# Patient Record
Sex: FEMALE | Race: WHITE
[De-identification: names, ages, dates, MRNs, and addresses within clinical notes are randomized per-mention and may not be internally consistent; named-entity substitution may affect disease eponyms.]

---

## 2019-07-29 ENCOUNTER — HOSPITAL ENCOUNTER (INPATIENT)
Dept: HOSPITAL 92 - ERS | Age: 54
LOS: 5 days | Discharge: HOME | DRG: 381 | End: 2019-08-03
Attending: INTERNAL MEDICINE | Admitting: INTERNAL MEDICINE
Payer: COMMERCIAL

## 2019-07-29 VITALS — BODY MASS INDEX: 29.4 KG/M2

## 2019-07-29 DIAGNOSIS — F41.9: ICD-10-CM

## 2019-07-29 DIAGNOSIS — K22.10: Primary | ICD-10-CM

## 2019-07-29 DIAGNOSIS — Z88.8: ICD-10-CM

## 2019-07-29 DIAGNOSIS — E87.6: ICD-10-CM

## 2019-07-29 DIAGNOSIS — M19.90: ICD-10-CM

## 2019-07-29 DIAGNOSIS — Z90.710: ICD-10-CM

## 2019-07-29 DIAGNOSIS — K57.92: ICD-10-CM

## 2019-07-29 DIAGNOSIS — F32.9: ICD-10-CM

## 2019-07-29 DIAGNOSIS — F10.10: ICD-10-CM

## 2019-07-29 DIAGNOSIS — I10: ICD-10-CM

## 2019-07-29 DIAGNOSIS — Z90.49: ICD-10-CM

## 2019-07-29 DIAGNOSIS — Z79.899: ICD-10-CM

## 2019-07-29 LAB
ALBUMIN SERPL BCG-MCNC: 4.7 G/DL (ref 3.5–5)
ALP SERPL-CCNC: 169 U/L (ref 40–150)
ALT SERPL W P-5'-P-CCNC: 11 U/L (ref 8–55)
ANION GAP SERPL CALC-SCNC: 20 MMOL/L (ref 10–20)
AST SERPL-CCNC: 23 U/L (ref 5–34)
BASOPHILS # BLD AUTO: 0 THOU/UL (ref 0–0.2)
BASOPHILS NFR BLD AUTO: 0.3 % (ref 0–1)
BILIRUB SERPL-MCNC: 0.6 MG/DL (ref 0.2–1.2)
BUN SERPL-MCNC: 17 MG/DL (ref 9.8–20.1)
CALCIUM SERPL-MCNC: 10.6 MG/DL (ref 7.8–10.44)
CHLORIDE SERPL-SCNC: 104 MMOL/L (ref 98–107)
CO2 SERPL-SCNC: 23 MMOL/L (ref 22–29)
CREAT CL PREDICTED SERPL C-G-VRATE: 0 ML/MIN (ref 70–130)
EOSINOPHIL # BLD AUTO: 0.1 THOU/UL (ref 0–0.7)
EOSINOPHIL NFR BLD AUTO: 0.4 % (ref 0–10)
GLOBULIN SER CALC-MCNC: 3.3 G/DL (ref 2.4–3.5)
GLUCOSE SERPL-MCNC: 123 MG/DL (ref 70–105)
HGB BLD-MCNC: 15 G/DL (ref 12–16)
LIPASE SERPL-CCNC: 25 U/L (ref 8–78)
LYMPHOCYTES # BLD: 2.2 THOU/UL (ref 1.2–3.4)
LYMPHOCYTES NFR BLD AUTO: 14.7 % (ref 21–51)
MCH RBC QN AUTO: 31.6 PG (ref 27–31)
MCV RBC AUTO: 90.6 FL (ref 78–98)
MONOCYTES # BLD AUTO: 1 THOU/UL (ref 0.11–0.59)
MONOCYTES NFR BLD AUTO: 6.5 % (ref 0–10)
NEUTROPHILS # BLD AUTO: 11.7 THOU/UL (ref 1.4–6.5)
NEUTROPHILS NFR BLD AUTO: 78.1 % (ref 42–75)
PLATELET # BLD AUTO: 340 THOU/UL (ref 130–400)
POTASSIUM SERPL-SCNC: 3.8 MMOL/L (ref 3.5–5.1)
PREGS CONTROL BACKGROUND?: (no result)
PREGS CONTROL BAR APPEAR?: YES
PROT UR STRIP.AUTO-MCNC: 20 MG/DL
RBC # BLD AUTO: 4.75 MILL/UL (ref 4.2–5.4)
SODIUM SERPL-SCNC: 143 MMOL/L (ref 136–145)
WBC # BLD AUTO: 15 THOU/UL (ref 4.8–10.8)

## 2019-07-29 PROCEDURE — 81003 URINALYSIS AUTO W/O SCOPE: CPT

## 2019-07-29 PROCEDURE — 96374 THER/PROPH/DIAG INJ IV PUSH: CPT

## 2019-07-29 PROCEDURE — 74177 CT ABD & PELVIS W/CONTRAST: CPT

## 2019-07-29 PROCEDURE — 90732 PPSV23 VACC 2 YRS+ SUBQ/IM: CPT

## 2019-07-29 PROCEDURE — 80053 COMPREHEN METABOLIC PANEL: CPT

## 2019-07-29 PROCEDURE — 96375 TX/PRO/DX INJ NEW DRUG ADDON: CPT

## 2019-07-29 PROCEDURE — 90471 IMMUNIZATION ADMIN: CPT

## 2019-07-29 PROCEDURE — 84443 ASSAY THYROID STIM HORMONE: CPT

## 2019-07-29 PROCEDURE — 36415 COLL VENOUS BLD VENIPUNCTURE: CPT

## 2019-07-29 PROCEDURE — 83690 ASSAY OF LIPASE: CPT

## 2019-07-29 PROCEDURE — 80307 DRUG TEST PRSMV CHEM ANLYZR: CPT

## 2019-07-29 PROCEDURE — 93005 ELECTROCARDIOGRAM TRACING: CPT

## 2019-07-29 PROCEDURE — 96361 HYDRATE IV INFUSION ADD-ON: CPT

## 2019-07-29 PROCEDURE — G0009 ADMIN PNEUMOCOCCAL VACCINE: HCPCS

## 2019-07-29 PROCEDURE — 80306 DRUG TEST PRSMV INSTRMNT: CPT

## 2019-07-29 PROCEDURE — 80048 BASIC METABOLIC PNL TOTAL CA: CPT

## 2019-07-29 PROCEDURE — 84703 CHORIONIC GONADOTROPIN ASSAY: CPT

## 2019-07-29 PROCEDURE — 84702 CHORIONIC GONADOTROPIN TEST: CPT

## 2019-07-29 PROCEDURE — C9113 INJ PANTOPRAZOLE SODIUM, VIA: HCPCS

## 2019-07-29 PROCEDURE — 83735 ASSAY OF MAGNESIUM: CPT

## 2019-07-29 PROCEDURE — 82274 ASSAY TEST FOR BLOOD FECAL: CPT

## 2019-07-29 PROCEDURE — 85025 COMPLETE CBC W/AUTO DIFF WBC: CPT

## 2019-07-29 RX ADMIN — ONDANSETRON PRN MG: 2 INJECTION INTRAMUSCULAR; INTRAVENOUS at 18:09

## 2019-07-29 NOTE — CT
CT abdomen and pelvis with IV contrast



HISTORY: Left lower quadrant pain.



FINDINGS: The lung bases are clear. Gallbladder is surgically absent. The liver, spleen, kidneys, adr
enal glands, and pancreas have a normal CT appearance. Nonspecific lymph nodes throughout the

retroperitoneum. Colon is decompressed with borderline diffuse fatty infiltration. Diverticula arise 
from the colon without adjacent inflammation. No free air or free fluid. Degenerative changes

lumbar spine.







IMPRESSION: Diverticulosis. No evidence of diverticulitis.



Status post cholecystectomy



Reported By: ABBY Cortes 

Electronically Signed:  7/29/2019 10:36 AM

## 2019-07-30 LAB
ALBUMIN SERPL BCG-MCNC: 3.6 G/DL (ref 3.5–5)
ALP SERPL-CCNC: 120 U/L (ref 40–150)
ALT SERPL W P-5'-P-CCNC: 8 U/L (ref 8–55)
ANION GAP SERPL CALC-SCNC: 12 MMOL/L (ref 10–20)
AST SERPL-CCNC: 21 U/L (ref 5–34)
BASOPHILS # BLD AUTO: 0.1 THOU/UL (ref 0–0.2)
BASOPHILS NFR BLD AUTO: 0.7 % (ref 0–1)
BILIRUB SERPL-MCNC: 0.6 MG/DL (ref 0.2–1.2)
BUN SERPL-MCNC: 7 MG/DL (ref 9.8–20.1)
CALCIUM SERPL-MCNC: 8.3 MG/DL (ref 7.8–10.44)
CHLORIDE SERPL-SCNC: 108 MMOL/L (ref 98–107)
CO2 SERPL-SCNC: 22 MMOL/L (ref 22–29)
CREAT CL PREDICTED SERPL C-G-VRATE: 115 ML/MIN (ref 70–130)
DRUG SCREEN CUTOFF: (no result)
EOSINOPHIL # BLD AUTO: 0.2 THOU/UL (ref 0–0.7)
EOSINOPHIL NFR BLD AUTO: 2.3 % (ref 0–10)
GLOBULIN SER CALC-MCNC: 2.5 G/DL (ref 2.4–3.5)
GLUCOSE SERPL-MCNC: 92 MG/DL (ref 70–105)
HGB BLD-MCNC: 12.8 G/DL (ref 12–16)
LYMPHOCYTES # BLD: 2.1 THOU/UL (ref 1.2–3.4)
LYMPHOCYTES NFR BLD AUTO: 23.3 % (ref 21–51)
MCH RBC QN AUTO: 32.5 PG (ref 27–31)
MCV RBC AUTO: 96.6 FL (ref 78–98)
MEDTOX CONTROL LINE VALID?: (no result)
MEDTOX READER #: (no result)
MONOCYTES # BLD AUTO: 0.7 THOU/UL (ref 0.11–0.59)
MONOCYTES NFR BLD AUTO: 8.1 % (ref 0–10)
NEUTROPHILS # BLD AUTO: 5.8 THOU/UL (ref 1.4–6.5)
NEUTROPHILS NFR BLD AUTO: 65.7 % (ref 42–75)
PLATELET # BLD AUTO: 235 THOU/UL (ref 130–400)
POTASSIUM SERPL-SCNC: 3.1 MMOL/L (ref 3.5–5.1)
RBC # BLD AUTO: 3.95 MILL/UL (ref 4.2–5.4)
SODIUM SERPL-SCNC: 139 MMOL/L (ref 136–145)
WBC # BLD AUTO: 8.8 THOU/UL (ref 4.8–10.8)

## 2019-07-30 RX ADMIN — ONDANSETRON PRN MG: 2 INJECTION INTRAMUSCULAR; INTRAVENOUS at 08:27

## 2019-07-30 RX ADMIN — ONDANSETRON PRN MG: 2 INJECTION INTRAMUSCULAR; INTRAVENOUS at 21:41

## 2019-07-30 RX ADMIN — ASCORBIC ACID, VITAMIN A PALMITATE, CHOLECALCIFEROL, THIAMINE HYDROCHLORIDE, RIBOFLAVIN-5 PHOSPHATE SODIUM, PYRIDOXINE HYDROCHLORIDE, NIACINAMIDE, DEXPANTHENOL, ALPHA-TOCOPHEROL ACETATE, VITAMIN K1, FOLIC ACID, BIOTIN, CYANOCOBALAMIN SCH MLS: 200; 3300; 200; 6; 3.6; 6; 40; 15; 10; 150; 600; 60; 5 INJECTION, SOLUTION INTRAVENOUS at 11:05

## 2019-07-30 RX ADMIN — ONDANSETRON PRN MG: 2 INJECTION INTRAMUSCULAR; INTRAVENOUS at 14:10

## 2019-07-30 NOTE — HP
PRIMARY CARE PHYSICIAN:  Patient no longer has PCP.



CODE STATUS:  The patient is a full code.



HISTORY OF PRESENT ILLNESS:  The patient is an unfortunate 54-year-old female 
with

past medical history of alcohol abuse, anxiety, and hypertension that presents 
for

evaluation of nausea, vomiting, and diarrhea x3 days.  The patient reports 
multiple

stressors in her life.  She recently lost her job with [id number] and is at odds 
with her

daughter.  She has a history of drinking approximately 6 beers daily or 1/3 
handle

of rum for the past 15 years.  She reports going on a 3 day wang in what she 
drank

4 handle of rum daily for 3 days after that she decided to quit "cold turkey" 
and over

the past several days, developed nausea, vomiting, and diarrhea and this is what

brought her into the ER.  In the ER, the patient was found to be tachycardic.  
The

patient was given IV fluids, Ativan and some folic acid which did not improve 
heart rate.

 The patient was then admitted to tele floor for further management. 



PAST MEDICAL HISTORY:  

1. Hypertension.

2. Osteoarthritis.

3. Anxiety.



PAST SURGICAL HISTORY:  

1. The patient has had an appendectomy.

2. Cholecystectomy.

3. Partial hysterectomy.



SOCIAL HISTORY:  The patient admits to drinking 6 beers a day or 1/3 handle of 
rum a

day for the past 15 years.  She denies any tobacco usage and denies any illicit 
drug

use.  The patient lives alone in a duplex. The patient has recently

lost her job. 



FAMILY HISTORY:  Noncontributory.



PSYCHIATRIC HISTORY:  The patient reports a history of anxiety for which she 
takes

Prozac. 



ALLERGIES:  PATIENT REPORTS AN ALLERGY TO LISINOPRIL, GAVE HER ANGIOEDEMA.



HOME MEDICATIONS:  

1. Prozac 40 mg p.o. at bedtime.

2. Benicar 40 mg p.o. daily.

3. Celebrex 100 mg p.o. b.i.d.



REVIEW OF SYSTEMS:  All other systems reviewed are otherwise negative unless 
stated

in HPI. 



PHYSICAL EXAMINATION:

VITAL SIGNS:  Temperature 99.9, blood pressure 140/94, pulse 117, respirations 
18,

99% on room air. 

CONSTITUTIONAL:  The patient is alert and oriented x3.  The patient was very

emotional while peaking. 

HEAD:  Normocephalic, atraumatic. 

EYES:  PERRL.  Extraocular muscles intact.  Sclerae nonicteric. 

ENT:  Nares patent.  Oropharynx clear.  Moist mucous membranes.  Uvula in the

midline. 

NECK:  Supple.  Trachea midline.  No lymphadenopathy. 

RESPIRATIONS:  :  Respirations were even and nonlabored.  No rhonchi, wheezes or

rales. 

CARDIOVASCULAR:  The patient was tachycardic.  No murmurs, rubs, or gallops. 

GI:  Abdomen is soft and nontender.  She had active bowel sounds.  No palpable

splenomegaly.  No peritoneal signs. 

EXTREMITIES:  Bilateral upper extremities.  No swelling or edema.  No cyanosis.
  No

clubbing.  Palpable pulses.  Lower extremities, no swelling, no edema.  No 
cyanosis

or clubbing and palpable pulses. 

NEUROLOGIC:  The patient was alert, oriented to person, place, and time.  
Moving all

extremities. 

SKIN:  Clean, dry, and intact.



LABORATORY DATA:  Chemistries, sodium was 143, potassium 3.8, BUN was 17, 
creatinine

was 0.5, glucose was 123, calcium was 10.6, AST 23, ALT 11, alkaline phosphatase

169, albumin 4.7, lipase was 25. 



Hematology:  The WBCs were 15, hemoglobin was 15, hematocrit was 43, platelets 
were

340.  Her plasma alcohol was less than 10. 



IMAGING:  CT of the abdomen and pelvis, impression showed diverticulosis.  No

evidence of diverticulitis. 



ASSESSMENT AND PLAN:  

1. Alcohol abuse.  The patient admits to 15 year use of alcohol and a wang 
lasting

3 days, for which she then stopped drinking "cold turkey."  The patient was 
found to

be tachycardic in the ER and was still mildly tachycardic upon assessment.  We 
will

continue IV fluids.  We will add a banana bag daily. We will initiate ACE 
protocol. 

2. Anxiety.  Given the patient's past medical history of alcohol abuse and 
recent

stressors of losing her job in the past month home, we will restart patient's 
home

medication of Prozac.  We will consult Case Management for further placement. 

3. Hypertension.  We will restart patient's home medications.

4. Gastrointestinal and deep venous thrombosis prophylaxis.

5. Hospital course depending on clinical findings.







Job ID:  [id number]



[facility]

## 2019-07-30 NOTE — PDOC.HOSPP
- Subjective


Subjective: 





pt nauseated and having emesis





- Objective


Vital Signs & Weight: 


 Vital Signs (12 hours)











  Temp Pulse Pulse Resp BP BP BP


 


 07/30/19 10:54  99.5 F  102 H   16   


 


 07/30/19 09:42    106 H    180/97 H  188/107 H


 


 07/30/19 07:56       


 


 07/30/19 07:30  97.9 F  106 H   18   


 


 07/30/19 04:00      142/73 H  














  BP Pulse Ox


 


 07/30/19 10:54  142/90 H  96


 


 07/30/19 09:42  


 


 07/30/19 07:56   95


 


 07/30/19 07:30  167/90 H  94 L


 


 07/30/19 04:00  








 Weight











Admit Weight                   177 lb


 


Weight                         177 lb














Result Diagrams: 


 07/30/19 04:32





 07/30/19 04:32





ROS





- Review of Systems


All systems: 


All other ROS were reviewed and found negative.





Respiratory: denies: cough, dry, shortness of breath, hemoptysis, SOB with 

excertion, pleuritic pain, sputum, wheezing, other


Cardiovascular: denies: chest pain, palpitations, orthopnea, paroxysmal noc. 

dyspnea, edema, light headedness, other


Gastrointestinal: reports: nausea, vomitting





- Medication


Medications: 


Active Medications











Generic Name Dose Route Start Last Admin





  Trade Name Freq  PRN Reason Stop Dose Admin


 


Famotidine  20 mg  07/29/19 21:00  07/30/19 08:31





  Pepcid  PO   20 mg





  BID ESDRICK   Administration





     





     





     





     


 


Fluoxetine HCl  40 mg  07/29/19 21:00  07/29/19 21:55





  Prozac  PO   40 mg





  HS SEDRICK   Administration





     





     





     





     


 


Sodium Chloride  1,000 mls @ 100 mls/hr  07/29/19 20:00  07/30/19 08:31





  Normal Saline 0.9%  IV   1,000 mls





  .Q10H SEDRICK   Administration





     





     





     





     


 


Multivitamins 10 ml/ Folic  1,011.2 mls @ 100 mls/hr  07/30/19 09:00  07/30/19 

11:05





Acid 1 mg/ Thiamine HCl 100 mg  IV   1,011.2 mls





/ Dextrose/Sodium Chloride  DAILY SEDRICK   Administration





     





     





     





     


 


Lorazepam  2 mg  07/29/19 14:54  07/29/19 21:55





  Ativan  SLOW IVP   2 mg





  Q30MIN PRN   Administration





  AGITATION/WITHDRAWAL   





     





     





     


 


Olmesartan  40 mg  07/30/19 09:00  07/30/19 08:31





  Benicar  PO   40 mg





  DAILY SEDRICK   Administration





     





     





     





     


 


Ondansetron HCl  4 mg  07/29/19 16:58  07/30/19 14:10





  Zofran  IVP   4 mg





  Q6H PRN   Administration





  Nausea/Vomiting   





     





     





     














- Exam


Heart: negative: RRR, no murmur, no gallops, no rubs, normal peripheral pulses, 

irregular, diminshed peripheral pulses, murmur present, II/IV, III/IV


Respiratory: negative: CTAB, no wheezes, no rales, no ronchi, normal chest 

expansion, no tachypnea, normal percussion, rales, rhonchi, tachypneic, wheezes


Gastrointestinal: negative: soft, non-tender, non-distended, normal bowel sounds

, no palpable masses, no hepatomegaly, no splenomegaly, no bruit, tender to 

palpation, distended, diminished bowl sounds, voluntary guarding





Hosp A/P


(1) Nausea & vomiting


Code(s): R11.2 - NAUSEA WITH VOMITING, UNSPECIFIED   Status: Acute   





(2) Alcohol abuse


Code(s): F10.10 - ALCOHOL ABUSE, UNCOMPLICATED   Status: Acute   





(3) Hypokalemia


Code(s): E87.6 - HYPOKALEMIA   Status: Acute   





- Plan





will replace K and will check mag. prn zofran is ordered. pt on ASE protocol. 

will add librium to her medication

## 2019-07-31 LAB
ALBUMIN SERPL BCG-MCNC: 3.3 G/DL (ref 3.5–5)
ALP SERPL-CCNC: 102 U/L (ref 40–150)
ALT SERPL W P-5'-P-CCNC: 8 U/L (ref 8–55)
ANION GAP SERPL CALC-SCNC: 10 MMOL/L (ref 10–20)
AST SERPL-CCNC: 20 U/L (ref 5–34)
BASOPHILS # BLD AUTO: 0 THOU/UL (ref 0–0.2)
BASOPHILS NFR BLD AUTO: 0.6 % (ref 0–1)
BILIRUB SERPL-MCNC: 0.4 MG/DL (ref 0.2–1.2)
BUN SERPL-MCNC: 4 MG/DL (ref 9.8–20.1)
CALCIUM SERPL-MCNC: 8.6 MG/DL (ref 7.8–10.44)
CHLORIDE SERPL-SCNC: 108 MMOL/L (ref 98–107)
CO2 SERPL-SCNC: 25 MMOL/L (ref 22–29)
CREAT CL PREDICTED SERPL C-G-VRATE: 120 ML/MIN (ref 70–130)
EOSINOPHIL # BLD AUTO: 0.3 THOU/UL (ref 0–0.7)
EOSINOPHIL NFR BLD AUTO: 3.8 % (ref 0–10)
GLOBULIN SER CALC-MCNC: 2.3 G/DL (ref 2.4–3.5)
GLUCOSE SERPL-MCNC: 90 MG/DL (ref 70–105)
HGB BLD-MCNC: 12.7 G/DL (ref 12–16)
LYMPHOCYTES # BLD: 2 THOU/UL (ref 1.2–3.4)
LYMPHOCYTES NFR BLD AUTO: 26.2 % (ref 21–51)
MCH RBC QN AUTO: 31.7 PG (ref 27–31)
MCV RBC AUTO: 95.6 FL (ref 78–98)
MONOCYTES # BLD AUTO: 0.6 THOU/UL (ref 0.11–0.59)
MONOCYTES NFR BLD AUTO: 7.5 % (ref 0–10)
NEUTROPHILS # BLD AUTO: 4.7 THOU/UL (ref 1.4–6.5)
NEUTROPHILS NFR BLD AUTO: 61.9 % (ref 42–75)
PLATELET # BLD AUTO: 207 THOU/UL (ref 130–400)
POTASSIUM SERPL-SCNC: 3.2 MMOL/L (ref 3.5–5.1)
RBC # BLD AUTO: 4 MILL/UL (ref 4.2–5.4)
SODIUM SERPL-SCNC: 140 MMOL/L (ref 136–145)
WBC # BLD AUTO: 7.5 THOU/UL (ref 4.8–10.8)

## 2019-07-31 RX ADMIN — ONDANSETRON PRN MG: 2 INJECTION INTRAMUSCULAR; INTRAVENOUS at 08:50

## 2019-07-31 RX ADMIN — ONDANSETRON PRN MG: 2 INJECTION INTRAMUSCULAR; INTRAVENOUS at 14:52

## 2019-07-31 RX ADMIN — ASCORBIC ACID, VITAMIN A PALMITATE, CHOLECALCIFEROL, THIAMINE HYDROCHLORIDE, RIBOFLAVIN-5 PHOSPHATE SODIUM, PYRIDOXINE HYDROCHLORIDE, NIACINAMIDE, DEXPANTHENOL, ALPHA-TOCOPHEROL ACETATE, VITAMIN K1, FOLIC ACID, BIOTIN, CYANOCOBALAMIN SCH MLS: 200; 3300; 200; 6; 3.6; 6; 40; 15; 10; 150; 600; 60; 5 INJECTION, SOLUTION INTRAVENOUS at 09:28

## 2019-07-31 RX ADMIN — ONDANSETRON PRN MG: 2 INJECTION INTRAMUSCULAR; INTRAVENOUS at 20:42

## 2019-07-31 NOTE — PDOC.HOSPP
- Subjective


Subjective: 





pt up in bed complains of nausea and vomiting.





- Objective


Vital Signs & Weight: 


 Vital Signs (12 hours)











  Temp Pulse Pulse Pulse Resp BP BP


 


 07/31/19 11:16  98.9 F  78    17  


 


 07/31/19 10:11    89  87   197/104 H  139/90


 


 07/31/19 07:44  98.4 F  91    18  


 


 07/31/19 03:12  98.2 F  93    16  














  BP BP Pulse Ox


 


 07/31/19 11:16  178/97 H   98


 


 07/31/19 10:11   


 


 07/31/19 07:44  176/86 H   98


 


 07/31/19 03:12   183/104 H  93 L








 Weight











Admit Weight                   177 lb


 


Weight                         177 lb














I&O: 


 











 07/30/19 07/31/19 08/01/19





 06:59 06:59 06:59


 


Intake Total  2310 


 


Output Total  3300 


 


Balance  -990 











Result Diagrams: 


 07/31/19 04:29





 07/31/19 04:29





ROS





- Review of Systems


All systems: 


All other ROS were reviewed and found negative.





Respiratory: denies: cough, dry, shortness of breath, hemoptysis, SOB with 

excertion, pleuritic pain, sputum, wheezing, other


Cardiovascular: denies: chest pain, palpitations, orthopnea, paroxysmal noc. 

dyspnea, edema, light headedness, other


Gastrointestinal: reports: nausea, vomitting





- Medication


Medications: 


Active Medications











Generic Name Dose Route Start Last Admin





  Trade Name Freq  PRN Reason Stop Dose Admin


 


Acetaminophen  650 mg  07/30/19 21:24  07/31/19 08:52





  Tylenol  PO   650 mg





  Q6H PRN   Administration





  Headache/Fever or Pain   





     





     





     


 


Chlordiazepoxide HCl  10 mg  07/30/19 21:00  07/31/19 08:50





  Librium  PO   10 mg





  TID SEDRICK   Administration





     





     





     





     


 


Fluoxetine HCl  40 mg  07/29/19 21:00  07/30/19 21:41





  Prozac  PO   40 mg





  HS SEDRICK   Administration





     





     





     





     


 


Sodium Chloride  1,000 mls @ 100 mls/hr  07/29/19 20:00  07/31/19 05:33





  Normal Saline 0.9%  IV   Not Given





  .Q10H SEDRICK   





     





     





     





     


 


Multivitamins 10 ml/ Folic  1,011.2 mls @ 100 mls/hr  07/30/19 09:00  07/31/19 

09:28





Acid 1 mg/ Thiamine HCl 100 mg  IV   1,011.2 mls





/ Dextrose/Sodium Chloride  DAILY SEDRICK   Administration





     





     





     





     


 


Lorazepam  2 mg  07/30/19 16:06  07/30/19 21:41





  Ativan  SLOW IVP   2 mg





  Q6H PRN   Administration





  Anxiety/Agitation   





     





     





     


 


Olmesartan  40 mg  07/30/19 09:00  07/31/19 08:50





  Benicar  PO   40 mg





  DAILY SEDRICK   Administration





     





     





     





     


 


Ondansetron HCl  4 mg  07/29/19 16:58  07/31/19 08:50





  Zofran  IVP   4 mg





  Q6H PRN   Administration





  Nausea/Vomiting   





     





     





     














- Exam


Heart: negative: RRR, no murmur, no gallops, no rubs, normal peripheral pulses, 

irregular, diminshed peripheral pulses, murmur present, II/IV, III/IV


Respiratory: negative: CTAB, no wheezes, no rales, no ronchi, normal chest 

expansion, no tachypnea, normal percussion, rales, rhonchi, tachypneic, wheezes





Hosp A/P


(1) Nausea & vomiting


Code(s): R11.2 - NAUSEA WITH VOMITING, UNSPECIFIED   Status: Acute   





(2) Alcohol abuse


Code(s): F10.10 - ALCOHOL ABUSE, UNCOMPLICATED   Status: Acute   





(3) Hypokalemia


Code(s): E87.6 - HYPOKALEMIA   Status: Acute   





- Plan


pt was crying she has been under a lot of stress, still has nausea and states 

she has not been able to eat anything. will ask nursing to see how she does. 

lipase normal. will start pt on protonix.

## 2019-07-31 NOTE — CON
DATE OF CONSULTATION:  2019



Never seen Dr. Manda Narayanan.



REASON FOR CONSULTATION:  Nausea and vomiting, persistent.



HISTORY OF PRESENT ILLNESS:  Ms. Sheila Costa is a very pleasant 54-year-old

 female, hospitalized 2 days ago with nausea, vomiting, tachycardia and

what appears to be alcohol withdrawal.  The patient appears very comfortable.  She

is very emotional.  It appears she has multiple social issues involved.  Apparently,

she got laid off from [employer] in [place] recently.  She also has no good

rapport with her mother who lives in [place], [place].  The patient came to the

ER because of nausea and vomiting.  She had no abdominal pain.  Denies any

dysphagia, odynophagia.  She has no heartburn.  She is very emotional and she is

kind of more tearful.  It appears that she has a lot of social issues involved

including depression and anxiety.  She denies any abdominal pain.  However, she says

every time she eats or drink something, the food comes up.  Her workup has been

basically negative.  Her abdominal CAT scan is negative except for __________.  No

evidence of pancreatitis.  No other relevant history. 



MEDICAL ILLNESS:  

1. Hypertension.

2. Osteoarthritis.

3. Anxiety, depression.



PAST SURGICAL HISTORY:  

1. Has had appendectomy in .

2. Cholecystectomy.

3. Partial hysterectomy.



SOCIAL HISTORY:  The patient is single.  She has a history of alcohol abuse and

drinks every day.  She drinks 6-12 beers every day and also drinks one half bottle

of rum every day.  No history of any drug abuse.  No history of any smoking. 



ALLERGIES:  LISINOPRIL.



FAMILY HISTORY:  Has no good report with the mother.  Her father  of a brain

aneurysm many years ago. 



HOME MEDICATIONS:  

1. Prozac.

2. Benicar.

3. Celebrex.



REVIEW OF SYSTEMS:  A 10-point systems reviewed. 

CONSTITUTIONAL:  No history of any weight loss, has good exercise tolerance.  No

history of any fever. 

HEENT:  No headache.  No dizziness.  No syncope.  No hearing loss.  No eyesight

impairment.  No sore throat.  No dysphagia. 

LUNGS:  No dyspnea, chronic cough, or hemoptysis. 

CARDIOVASCULAR:  No chest pain.  No palpitation, dyspnea, orthopnea, PND. 

GI:  Nausea and vomiting, but no abdominal pain. 

GENITOURINARY:  Unknown. 

NEUROPSYCHIATRIC:  History of anxiety and depression.



PHYSICAL EXAMINATION:

GENERAL:  She appears very comfortable, in no acute distress. 

VITAL SIGNS:  She is afebrile.  Pulse is 71, blood pressure is 183/90. 

HEENT:  Conjunctivae clear. 

NECK:  Supple.  No adenitis or thyromegaly noted.  CARDIOVASCULAR:  First and second

heart sounds are normal. 

LUNGS:  Clear to auscultation. 

ABDOMEN:  Soft.  Abdomen is nondistended.  Abdomen is nontender.  No organomegaly or

masses. 

EXTREMITIES:  Reveal no edema.



LABORATORY DATA:  From today, WBC 7500, hemoglobin 12.7, hematocrit 38.3, MCV 95.6,

platelet count is 207, polymorphs 61, lymphocytes 36, monocytes 7.  Chemistry panel

is basically normal.  Her liver function tests are likely normal.  Today, bilirubin

is 0.4, AST 20, ALT is 8, alkaline phosphatase 169 __________, albumin 3.3.  Chem-7

is normal except mild hypokalemia of 3.2, BUN is 4.  Abdomen CAT scan became

negative.  Her serum lipase on admission was 25. 



CLINICAL IMPRESSION:  A 54-year-old  female with history of chronic alcohol

abuse over the years, presents with nausea and vomiting, and some tachycardia.  She

has no abdominal pain.  Her workup has been basically negative.  Her serum lipase is

normal.  Also abdomen CAT scan normal.  She has more of a social issue with anxiety

and depression.  She is already on alprazolam, Librium, and also on Prozac.  The

patient tells me every time she tries to eat or drink, it is coming up.  It is

possible the patient could have erosive esophagitis or some other pathology.  I did

talk to Ms. Costa about the esophagogastroduodenoscopy and she is agreeable.  I

will plan for EGD tomorrow, which will be done by most likely Dr. Truong Seals who

is on-call.  Further recommendation after esophagogastroduodenoscopy. 







Job ID:  [id number]

## 2019-08-01 LAB
ANION GAP SERPL CALC-SCNC: 11 MMOL/L (ref 10–20)
BASOPHILS # BLD AUTO: 0 THOU/UL (ref 0–0.2)
BASOPHILS NFR BLD AUTO: 0.5 % (ref 0–1)
BUN SERPL-MCNC: 4 MG/DL (ref 9.8–20.1)
CALCIUM SERPL-MCNC: 9.3 MG/DL (ref 7.8–10.44)
CHLORIDE SERPL-SCNC: 105 MMOL/L (ref 98–107)
CO2 SERPL-SCNC: 26 MMOL/L (ref 22–29)
CREAT CL PREDICTED SERPL C-G-VRATE: 103 ML/MIN (ref 70–130)
EOSINOPHIL # BLD AUTO: 0.2 THOU/UL (ref 0–0.7)
EOSINOPHIL NFR BLD AUTO: 2.3 % (ref 0–10)
GLUCOSE SERPL-MCNC: 100 MG/DL (ref 70–105)
HGB BLD-MCNC: 13.8 G/DL (ref 12–16)
LYMPHOCYTES # BLD: 1.5 THOU/UL (ref 1.2–3.4)
LYMPHOCYTES NFR BLD AUTO: 17.4 % (ref 21–51)
MCH RBC QN AUTO: 31.8 PG (ref 27–31)
MCV RBC AUTO: 93.8 FL (ref 78–98)
MONOCYTES # BLD AUTO: 0.5 THOU/UL (ref 0.11–0.59)
MONOCYTES NFR BLD AUTO: 6 % (ref 0–10)
NEUTROPHILS # BLD AUTO: 6.2 THOU/UL (ref 1.4–6.5)
NEUTROPHILS NFR BLD AUTO: 73.8 % (ref 42–75)
PLATELET # BLD AUTO: 232 THOU/UL (ref 130–400)
POTASSIUM SERPL-SCNC: 4.2 MMOL/L (ref 3.5–5.1)
RBC # BLD AUTO: 4.32 MILL/UL (ref 4.2–5.4)
SODIUM SERPL-SCNC: 138 MMOL/L (ref 136–145)
WBC # BLD AUTO: 8.4 THOU/UL (ref 4.8–10.8)

## 2019-08-01 PROCEDURE — 0DJ08ZZ INSPECTION OF UPPER INTESTINAL TRACT, VIA NATURAL OR ARTIFICIAL OPENING ENDOSCOPIC: ICD-10-PCS | Performed by: INTERNAL MEDICINE

## 2019-08-01 RX ADMIN — ONDANSETRON PRN MG: 2 INJECTION INTRAMUSCULAR; INTRAVENOUS at 20:11

## 2019-08-01 RX ADMIN — ASCORBIC ACID, VITAMIN A PALMITATE, CHOLECALCIFEROL, THIAMINE HYDROCHLORIDE, RIBOFLAVIN-5 PHOSPHATE SODIUM, PYRIDOXINE HYDROCHLORIDE, NIACINAMIDE, DEXPANTHENOL, ALPHA-TOCOPHEROL ACETATE, VITAMIN K1, FOLIC ACID, BIOTIN, CYANOCOBALAMIN SCH MLS: 200; 3300; 200; 6; 3.6; 6; 40; 15; 10; 150; 600; 60; 5 INJECTION, SOLUTION INTRAVENOUS at 09:10

## 2019-08-01 NOTE — PDOC.HOSPP
- Subjective


Subjective: 





pt up walking around. 





- Objective


Vital Signs & Weight: 


 Vital Signs (12 hours)











  Temp Pulse Pulse Resp BP BP Pulse Ox


 


 08/01/19 13:27    92   154/71 H  


 


 08/01/19 11:28  98.2 F  89   17   169/93 H  98


 


 08/01/19 07:17  98.5 F  94   17   168/101 H  97








 Weight











Admit Weight                   177 lb


 


Weight                         177 lb














I&O: 


 











 07/31/19 08/01/19 08/02/19





 06:59 06:59 06:59


 


Intake Total 2310 3100 


 


Output Total 3300 4200 


 


Balance -990 -1100 











Result Diagrams: 


 08/01/19 10:34





 08/01/19 10:34





ROS





- Review of Systems


All systems: 


All other ROS were reviewed and found negative.





Respiratory: denies: cough, dry, shortness of breath, hemoptysis, SOB with 

excertion, pleuritic pain, sputum, wheezing, other


Cardiovascular: denies: chest pain, palpitations, orthopnea, paroxysmal noc. 

dyspnea, edema, light headedness, other


Gastrointestinal: reports: nausea, vomitting





- Medication


Medications: 


Active Medications











Generic Name Dose Route Start Last Admin





  Trade Name Freq  PRN Reason Stop Dose Admin


 


Acetaminophen  650 mg  07/30/19 21:24  07/31/19 08:52





  Tylenol  PO   650 mg





  Q6H PRN   Administration





  Headache/Fever or Pain   





     





     





     


 


Chlordiazepoxide HCl  10 mg  07/30/19 21:00  08/01/19 14:01





  Librium  PO   10 mg





  TID SEDRICK   Administration





     





     





     





     


 


Fluoxetine HCl  40 mg  07/29/19 21:00  07/31/19 20:42





  Prozac  PO   40 mg





  HS SEDRICK   Administration





     





     





     





     


 


Sodium Chloride  1,000 mls @ 100 mls/hr  07/29/19 20:00  08/01/19 04:38





  Normal Saline 0.9%  IV   1,000 mls





  .Q10H SEDRICK   Administration





     





     





     





     


 


Multivitamins 10 ml/ Folic  1,011.2 mls @ 100 mls/hr  07/30/19 09:00  08/01/19 

09:10





Acid 1 mg/ Thiamine HCl 100 mg  IV   1,011.2 mls





/ Dextrose/Sodium Chloride  DAILY SEDRICK   Administration





     





     





     





     


 


Lorazepam  2 mg  07/30/19 16:06  07/31/19 20:42





  Ativan  SLOW IVP   2 mg





  Q6H PRN   Administration





  Anxiety/Agitation   





     





     





     


 


Olmesartan  40 mg  07/30/19 09:00  08/01/19 09:09





  Benicar  PO   40 mg





  DAILY SEDRICK   Administration





     





     





     





     


 


Ondansetron HCl  4 mg  07/29/19 16:58  07/31/19 20:42





  Zofran  IVP   4 mg





  Q6H PRN   Administration





  Nausea/Vomiting   





     





     





     


 


Pantoprazole Sodium  40 mg  07/31/19 21:00  08/01/19 09:10





  Protonix  IVP   40 mg





  Q12HR SEDRICK   Administration





     





     





     





     


 


Potassium Chloride  10 meq  07/31/19 17:00  08/01/19 09:09





  Klor-Con 10  PO   10 meq





  BID-WM SEDRICK   Administration





     





     





     





     














- Exam


Heart: negative: RRR, no murmur, no gallops, no rubs, normal peripheral pulses, 

irregular, diminshed peripheral pulses, murmur present, II/IV, III/IV


Respiratory: negative: CTAB, no wheezes, no rales, no ronchi, normal chest 

expansion, no tachypnea, normal percussion, rales, rhonchi, tachypneic, wheezes


Gastrointestinal: negative: soft, non-tender, non-distended, normal bowel sounds

, no palpable masses, no hepatomegaly, no splenomegaly, no bruit, tender to 

palpation, distended, diminished bowl sounds, voluntary guarding





Hosp A/P


(1) Nausea & vomiting


Code(s): R11.2 - NAUSEA WITH VOMITING, UNSPECIFIED   Status: Acute   





(2) Alcohol abuse


Code(s): F10.10 - ALCOHOL ABUSE, UNCOMPLICATED   Status: Acute   





(3) Hypokalemia


Code(s): E87.6 - HYPOKALEMIA   Status: Acute   





- Plan





will continue ppi. pt to get EGD today. will continue iv fluids.

## 2019-08-01 NOTE — OP
DATE OF PROCEDURE:  08/01/2019



PROCEDURE:  Esophagogastroduodenoscopy (diagnostic).



INDICATIONS FOR PROCEDURE:  Nausea and vomiting, alcohol withdrawal.



DESCRIPTION OF PROCEDURE:  After the risks and benefits of the procedure were

explained to the patient including risks of bleeding, infection, perforation,

reactions to anesthesia, aspiration and/or pain, informed consent was obtained.  The

patient was then taken to the endoscopy suite, where deep sedation was administered

via propofol and anesthesia support.  Once adequate sedation was achieved, the

standard gastroscope was introduced into the mouth with intubation of the esophagus,

stomach, and the proximal small intestines with the findings listed below.  The

patient tolerated the procedure well with no immediate perioperative complications.

Upon completion of the procedure, all equipment was removed from the patient and she

was transferred to PACU in satisfactory condition. 



FINDINGS:  Esophagus:  Normal-appearing mucosa was seen in the proximal and mid

esophagus.  However, multiple small erosions were seen in the distal esophagus that

were scattered up to 7 cm proximal to the gastroesophageal junction.  There was one

prominent linear erosion extending from the GE junction around 1 to 2 cm that

exhibited mildly increased erythema compared to the rest of them, but did not

exhibit overt ulceration or active/recent bleeding.  These erosions did not appear

to be confluent nor did they extend between gastric or esophageal folds.  Otherwise,

there was no evidence of ulcerations, mass, lesions, or active/recent bleeding. 



Stomach:  Normal-appearing mucosa was seen in the gastric cardia, fundus, body,

greater curvature, antrum, and incisura.  There was no evidence of erosions,

ulcerations, mass, lesions, or active/recent bleeding. 



Duodenum:  Normal-appearing mucosa was seen in both the duodenal bulb and second

portion of the duodenum.  There was no evidence of erosions, ulcerations, mass,

lesions, or active/recent bleeding. 



IMPRESSION:  

1. LA grade B reflux mediated erosive esophagitis, most likely due to her recent

episodes of vomiting. 

2. Otherwise normal upper endoscopy, with no etiology for her nausea and vomiting

seen during this examination. 



RECOMMENDATIONS:  

1. We would continue to monitor clinically for signs of improvement in nausea,

vomiting. 

2. We would continue to treat her for her alcohol withdrawal as nausea and vomiting

is a common symptom associated with this clinical entity. 

3. We would continue with aggressive antiemetic support.

4. We would continue PPI 40 mg b.i.d. given the evidence of erosive esophagitis.

5. Advance diet as tolerated. 

We will continue to follow.  Please call with any questions.







Job ID:  [id number]

## 2019-08-02 RX ADMIN — ONDANSETRON PRN MG: 2 INJECTION INTRAMUSCULAR; INTRAVENOUS at 22:40

## 2019-08-02 RX ADMIN — ONDANSETRON PRN MG: 2 INJECTION INTRAMUSCULAR; INTRAVENOUS at 08:45

## 2019-08-02 RX ADMIN — ONDANSETRON PRN MG: 2 INJECTION INTRAMUSCULAR; INTRAVENOUS at 15:52

## 2019-08-02 RX ADMIN — ONDANSETRON PRN MG: 2 INJECTION INTRAMUSCULAR; INTRAVENOUS at 21:35

## 2019-08-02 RX ADMIN — ASCORBIC ACID, VITAMIN A PALMITATE, CHOLECALCIFEROL, THIAMINE HYDROCHLORIDE, RIBOFLAVIN-5 PHOSPHATE SODIUM, PYRIDOXINE HYDROCHLORIDE, NIACINAMIDE, DEXPANTHENOL, ALPHA-TOCOPHEROL ACETATE, VITAMIN K1, FOLIC ACID, BIOTIN, CYANOCOBALAMIN SCH: 200; 3300; 200; 6; 3.6; 6; 40; 15; 10; 150; 600; 60; 5 INJECTION, SOLUTION INTRAVENOUS at 09:33

## 2019-08-02 NOTE — PDOC.HOSPP
- Subjective


Subjective: 





pt up in bed still is nauseated. very emotional and crying. 





- Objective


Vital Signs & Weight: 


 Vital Signs (12 hours)











  Temp Pulse Pulse Pulse Resp BP BP


 


 08/02/19 13:07    71  90    155/93 H


 


 08/02/19 11:53  98.4 F  79    18  174/95 H 


 


 08/02/19 07:39  98.7 F  120 H    20  138/92 H 


 


 08/02/19 04:15  98.1 F  98    17  














  BP BP BP BP Pulse Ox


 


 08/02/19 13:07  184/101 H    


 


 08/02/19 11:53   174/95 H    95


 


 08/02/19 07:39    148/82 H   95


 


 08/02/19 04:15     141/79 H  97








 Weight











Admit Weight                   177 lb


 


Weight                         177 lb














I&O: 


 











 08/01/19 08/02/19 08/03/19





 06:59 06:59 06:59


 


Intake Total 3100 2700 


 


Output Total 4200 1650 


 


Balance -1100 1050 











Result Diagrams: 


 08/01/19 10:34





 08/01/19 10:34





ROS





- Review of Systems


All systems: 


All other ROS were reviewed and found negative.





Respiratory: denies: cough, dry, shortness of breath, hemoptysis, SOB with 

excertion, pleuritic pain, sputum, wheezing, other


Cardiovascular: denies: chest pain, palpitations, orthopnea, paroxysmal noc. 

dyspnea, edema, light headedness, other





- Medication


Medications: 


Active Medications











Generic Name Dose Route Start Last Admin





  Trade Name Freq  PRN Reason Stop Dose Admin


 


Acetaminophen  650 mg  07/30/19 21:24  08/02/19 13:07





  Tylenol  PO   650 mg





  Q6H PRN   Administration





  Headache/Fever or Pain   





     





     





     


 


Chlordiazepoxide HCl  10 mg  07/30/19 21:00  08/02/19 08:29





  Librium  PO   10 mg





  TID SEDRICK   Administration





     





     





     





     


 


Fluoxetine HCl  40 mg  07/29/19 21:00  08/01/19 20:08





  Prozac  PO   40 mg





  HS SEDRICK   Administration





     





     





     





     


 


Hydralazine HCl  5 mg  08/01/19 15:34  08/01/19 18:23





  Apresoline  SLOW IVP   5 mg





  Q6H PRN   Administration





  Blood Pressure   





     





     





     


 


Sodium Chloride  1,000 mls @ 100 mls/hr  07/29/19 20:00  08/02/19 09:34





  Normal Saline 0.9%  IV   Not Given





  .Q10H SEDRICK   





     





     





     





     


 


Lorazepam  2 mg  07/30/19 16:06  08/02/19 08:30





  Ativan  SLOW IVP   2 mg





  Q6H PRN   Administration





  Anxiety/Agitation   





     





     





     


 


Olmesartan  40 mg  07/30/19 09:00  08/02/19 08:29





  Benicar  PO   40 mg





  DAILY SEDRICK   Administration





     





     





     





     


 


Ondansetron HCl  4 mg  07/29/19 16:58  08/02/19 08:45





  Zofran  IVP   4 mg





  Q6H PRN   Administration





  Nausea/Vomiting   





     





     





     


 


Pantoprazole Sodium  40 mg  07/31/19 21:00  08/02/19 08:29





  Protonix  IVP   40 mg





  Q12HR SEDRICK   Administration





     





     





     





     


 


Potassium Chloride  10 meq  07/31/19 17:00  08/02/19 08:29





  Klor-Con 10  PO   10 meq





  BID-WM SEDRICK   Administration





     





     





     





     














- Exam


Heart: negative: RRR, no murmur, no gallops, no rubs, normal peripheral pulses, 

irregular, diminshed peripheral pulses, murmur present, II/IV, III/IV


Respiratory: negative: CTAB, no wheezes, no rales, no ronchi, normal chest 

expansion, no tachypnea, normal percussion, rales, rhonchi, tachypneic, wheezes





Hosp A/P


(1) Nausea & vomiting


Code(s): R11.2 - NAUSEA WITH VOMITING, UNSPECIFIED   Status: Acute   





(2) Alcohol abuse


Code(s): F10.10 - ALCOHOL ABUSE, UNCOMPLICATED   Status: Acute   





(3) Hypokalemia


Code(s): E87.6 - HYPOKALEMIA   Status: Acute   





- Plan





will continue ppi, advance diet. pt on prozac. will add norvac for bp control. 

Her last drink was a week ago. she needs to see mental health once stable for 

antidepressants and alcohol abuse.

## 2019-08-03 VITALS — SYSTOLIC BLOOD PRESSURE: 141 MMHG | DIASTOLIC BLOOD PRESSURE: 65 MMHG

## 2019-08-03 VITALS — TEMPERATURE: 98.5 F

## 2019-08-03 NOTE — DIS
DATE OF ADMISSION:  07/29/2019



DATE OF DISCHARGE:  08/03/2019



DISCHARGE DIAGNOSES:  

1. Nausea and vomiting.

2. Alcohol abuse.

3. Hypokalemia.



HOSPITAL COURSE:  The patient is a 54-year-old female who initially presented to the

hospital on the 29th with complaints of abdominal pain, nausea, and vomiting.  She

had a CT scan with IV contrast that indicated diverticulosis.  There was no evidence

of diverticulitis.  The patient continued to have nausea and vomiting at this time.

The patient was seen by GI and underwent an endoscopy, which indicated large grade B

reflux mediated erosive gastritis, otherwise it was fairly normal.  Her lipase was

also normal.  The patient had significant bouts of crying and at this time when she

was stable to be discharged, she was seen by mental health, who states that that

they will make an appointment for her on Monday.  Recommended by GI to continue

antiemetics and also PPI.  Her diet was advanced.  She was able to tolerate her full

meal without any issues.  I will be discharging her home. 



HOME MEDICATIONS:  Will be as of the following.

1. Protonix 40 mg b.i.d.

2. Benicar 40 mg daily.

3. Fluoxetine 40 mg at bedtime.



PHYSICAL EXAMINATION:

VITAL SIGNS:  As of the following; temperature of 98.3, pulse 83, respirations 18,

96% on room air, blood pressure 110/68. 

GENERAL:  She is awake, alert, and oriented x3.  Does not appear in distress. 

CV:  S1 and S2 present.  No murmurs, rubs, or gallops. 

ABDOMEN:  Soft, nontender.  Bowel sounds are present x2. 



Again, she will be discharged home.  She will follow up with her primary and also

with Mental Health on Monday.  She currently has no suicidal thoughts and she also

will be helped in the Mental Health for alcohol abuse options. 







Job ID:  [id number]

## 2019-10-08 ENCOUNTER — HOSPITAL ENCOUNTER (EMERGENCY)
Dept: HOSPITAL 92 - ERS | Age: 54
Discharge: HOME | End: 2019-10-08
Payer: SELF-PAY

## 2019-10-08 DIAGNOSIS — Z79.899: ICD-10-CM

## 2019-10-08 DIAGNOSIS — I10: ICD-10-CM

## 2019-10-08 DIAGNOSIS — K08.89: Primary | ICD-10-CM

## 2019-10-08 DIAGNOSIS — F41.9: ICD-10-CM

## 2019-10-08 DIAGNOSIS — M19.90: ICD-10-CM

## 2019-10-08 PROCEDURE — 99283 EMERGENCY DEPT VISIT LOW MDM: CPT

## 2019-11-17 ENCOUNTER — HOSPITAL ENCOUNTER (EMERGENCY)
Dept: HOSPITAL 92 - ERS | Age: 54
Discharge: HOME | End: 2019-11-17
Payer: SELF-PAY

## 2019-11-17 DIAGNOSIS — Z79.899: ICD-10-CM
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
## 2019-11-17 DIAGNOSIS — S83.92XA: Primary | ICD-10-CM

## 2019-11-17 PROCEDURE — 96372 THER/PROPH/DIAG INJ SC/IM: CPT

## 2019-11-17 NOTE — RAD
Exam:4 views left knee



HISTORY: Pain



COMPARISON: None



FINDINGS: No joint effusion. No severe loss of joint space height. No fracture or malalignment.

There do appear to be loose body fragments on the posterior aspect of the joint space.



IMPRESSION: 

1. No fracture

2. Possible intra-articular loose body fragments. Further evaluation with nonemergent MRI.



Reported By: Lurdes Sinclair 

Electronically Signed:  11/17/2019 10:10 AM

## 2022-10-17 ENCOUNTER — RX ONLY (OUTPATIENT)
Age: 57
Setting detail: RX ONLY
End: 2022-10-17

## 2022-11-07 ENCOUNTER — APPOINTMENT (RX ONLY)
Dept: URBAN - METROPOLITAN AREA CLINIC 117 | Facility: CLINIC | Age: 57
Setting detail: DERMATOLOGY
End: 2022-11-07

## 2022-11-07 DIAGNOSIS — L57.0 ACTINIC KERATOSIS: ICD-10-CM

## 2022-11-07 DIAGNOSIS — Z71.89 OTHER SPECIFIED COUNSELING: ICD-10-CM

## 2022-11-07 DIAGNOSIS — L81.4 OTHER MELANIN HYPERPIGMENTATION: ICD-10-CM

## 2022-11-07 DIAGNOSIS — D18.0 HEMANGIOMA: ICD-10-CM

## 2022-11-07 PROBLEM — D48.5 NEOPLASM OF UNCERTAIN BEHAVIOR OF SKIN: Status: ACTIVE | Noted: 2022-11-07

## 2022-11-07 PROBLEM — D18.01 HEMANGIOMA OF SKIN AND SUBCUTANEOUS TISSUE: Status: ACTIVE | Noted: 2022-11-07

## 2022-11-07 PROCEDURE — ? LIQUID NITROGEN

## 2022-11-07 PROCEDURE — 11102 TANGNTL BX SKIN SINGLE LES: CPT

## 2022-11-07 PROCEDURE — ? COUNSELING

## 2022-11-07 PROCEDURE — ? BIOPSY BY SHAVE METHOD

## 2022-11-07 PROCEDURE — 99203 OFFICE O/P NEW LOW 30 MIN: CPT | Mod: 25

## 2022-11-07 PROCEDURE — 17000 DESTRUCT PREMALG LESION: CPT | Mod: 59

## 2022-11-07 ASSESSMENT — LOCATION ZONE DERM
LOCATION ZONE: TRUNK
LOCATION ZONE: NOSE
LOCATION ZONE: NOSE
LOCATION ZONE: ARM
LOCATION ZONE: NECK

## 2022-11-07 ASSESSMENT — LOCATION DETAILED DESCRIPTION DERM
LOCATION DETAILED: LEFT MEDIAL SUPERIOR CHEST
LOCATION DETAILED: RIGHT MEDIAL SUPERIOR CHEST
LOCATION DETAILED: LEFT PROXIMAL POSTERIOR UPPER ARM
LOCATION DETAILED: NASAL SUPRATIP
LOCATION DETAILED: LEFT INFERIOR ANTERIOR NECK
LOCATION DETAILED: NASAL SUPRATIP
LOCATION DETAILED: RIGHT PROXIMAL POSTERIOR UPPER ARM

## 2022-11-07 ASSESSMENT — LOCATION SIMPLE DESCRIPTION DERM
LOCATION SIMPLE: CHEST
LOCATION SIMPLE: LEFT ANTERIOR NECK
LOCATION SIMPLE: NOSE
LOCATION SIMPLE: RIGHT POSTERIOR UPPER ARM
LOCATION SIMPLE: LEFT POSTERIOR UPPER ARM
LOCATION SIMPLE: NOSE

## 2022-11-07 NOTE — HPI: SKIN LESION
What Type Of Note Output Would You Prefer (Optional)?: Bullet Format
How Severe Is Your Skin Lesion?: mild
Has Your Skin Lesion Been Treated?: not been treated
Is This A New Presentation, Or A Follow-Up?: Skin Lesion
Additional History: Patient states a 10 year history of a lesion on forehead that has never gone away. It will “pop” and bleed occasionally.
Additional History: Patient states a 3 year history of a hard lesion on tip of nose that is scaly. Patient does admit to picking at the lesion frequently

## 2022-11-07 NOTE — PROCEDURE: LIQUID NITROGEN
Show Applicator Variable?: Yes
Aperture Size (Optional): A
Render Post-Care Instructions In Note?: no
Detail Level: Detailed
Consent: The patient's consent was obtained including but not limited to risks of crusting, scabbing, blistering, scarring, darker or lighter pigmentary change, recurrence, incomplete removal and infection.
Post-Care Instructions: I reviewed with the patient in detail post-care instructions. Patient is to wear sunprotection, and avoid picking at any of the treated lesions. Pt may apply Vaseline to crusted or scabbing areas.
Duration Of Freeze Thaw-Cycle (Seconds): 3
Number Of Freeze-Thaw Cycles: 1 freeze-thaw cycle
Application Tool (Optional): Liquid Nitrogen Sprayer

## 2022-11-07 NOTE — PROCEDURE: BIOPSY BY SHAVE METHOD
Detail Level: Detailed
Depth Of Biopsy: dermis
Was A Bandage Applied: Yes
Size Of Lesion In Cm: 0
Biopsy Type: H and E
Biopsy Method: Dermablade
Anesthesia Type: 1% lidocaine without epinephrine and a 1:10 solution of 8.4% sodium bicarbonate
Anesthesia Volume In Cc: 0.5
Hemostasis: Ok's
Wound Care: Petrolatum
Dressing: bandage
Destruction After The Procedure: No
Type Of Destruction Used: Curettage
Curettage Text: The wound bed was treated with curettage after the biopsy was performed.
Cryotherapy Text: The wound bed was treated with cryotherapy after the biopsy was performed.
Electrodesiccation Text: The wound bed was treated with electrodesiccation after the biopsy was performed.
Electrodesiccation And Curettage Text: The wound bed was treated with electrodesiccation and curettage after the biopsy was performed.
Silver Nitrate Text: The wound bed was treated with silver nitrate after the biopsy was performed.
Lab: [id number]
Lab Facility: [id number]
Path Notes (To The Dermatopathologist): Check margins
Consent: Written consent was obtained and risks were reviewed including but not limited to scarring, infection, bleeding, scabbing, incomplete removal, nerve damage and allergy to anesthesia.
Post-Care Instructions: I reviewed with the patient in detail post-care instructions. Patient is to keep the biopsy site dry overnight, and then apply bacitracin twice daily until healed. Patient may apply hydrogen peroxide soaks to remove any crusting.
Notification Instructions: Patient will be notified of biopsy results. However, patient instructed to call the office if not contacted within 2 weeks.
Billing Type: Third-Party Bill
Information: Selecting Yes will display possible errors in your note based on the variables you have selected. This validation is only offered as a suggestion for you. PLEASE NOTE THAT THE VALIDATION TEXT WILL BE REMOVED WHEN YOU FINALIZE YOUR NOTE. IF YOU WANT TO FAX A PRELIMINARY NOTE YOU WILL NEED TO TOGGLE THIS TO 'NO' IF YOU DO NOT WANT IT IN YOUR FAXED NOTE.

## 2023-04-17 ENCOUNTER — HOSPITAL ENCOUNTER (OUTPATIENT)
Dept: HOSPITAL 92 - CSHMAMMO | Age: 58
Discharge: HOME | End: 2023-04-17
Attending: FAMILY MEDICINE
Payer: MEDICARE

## 2023-04-17 DIAGNOSIS — M85.851: ICD-10-CM

## 2023-04-17 DIAGNOSIS — Z13.820: ICD-10-CM

## 2023-04-17 DIAGNOSIS — Z12.31: Primary | ICD-10-CM

## 2023-04-17 DIAGNOSIS — M85.852: ICD-10-CM

## 2023-04-17 PROCEDURE — 77080 DXA BONE DENSITY AXIAL: CPT

## 2023-04-17 PROCEDURE — 77063 BREAST TOMOSYNTHESIS BI: CPT

## 2023-04-17 PROCEDURE — 77067 SCR MAMMO BI INCL CAD: CPT

## 2023-04-25 ENCOUNTER — HOSPITAL ENCOUNTER (OUTPATIENT)
Dept: HOSPITAL 92 - CSHULT | Age: 58
Discharge: HOME | End: 2023-04-25
Attending: FAMILY MEDICINE
Payer: MEDICARE

## 2023-04-25 DIAGNOSIS — N63.24: Primary | ICD-10-CM

## 2025-01-14 ENCOUNTER — APPOINTMENT (OUTPATIENT)
Dept: URBAN - METROPOLITAN AREA CLINIC 117 | Facility: CLINIC | Age: 60
Setting detail: DERMATOLOGY
End: 2025-01-14

## 2025-01-14 DIAGNOSIS — L71.8 OTHER ROSACEA: ICD-10-CM

## 2025-01-14 DIAGNOSIS — L23.1 ALLERGIC CONTACT DERMATITIS DUE TO ADHESIVES: ICD-10-CM

## 2025-01-14 DIAGNOSIS — L08.9 LOCAL INFECTION OF THE SKIN AND SUBCUTANEOUS TISSUE, UNSPECIFIED: ICD-10-CM | Status: INADEQUATELY CONTROLLED

## 2025-01-14 PROCEDURE — 99213 OFFICE O/P EST LOW 20 MIN: CPT

## 2025-01-14 PROCEDURE — ? RECOMMENDATIONS

## 2025-01-14 PROCEDURE — ? PRESCRIPTION

## 2025-01-14 PROCEDURE — ? COUNSELING

## 2025-01-14 PROCEDURE — ? PRESCRIPTION MEDICATION MANAGEMENT

## 2025-01-14 RX ORDER — TRIAMCINOLONE ACETONIDE 1 MG/G
CREAM TOPICAL
Qty: 30 | Refills: 0 | Status: ERX | COMMUNITY
Start: 2025-01-14

## 2025-01-14 RX ORDER — IVERMECTIN 10 MG/G
CREAM TOPICAL
Qty: 45 | Refills: 4 | Status: ERX | COMMUNITY
Start: 2025-01-14

## 2025-01-14 RX ADMIN — IVERMECTIN: 10 CREAM TOPICAL at 00:00

## 2025-01-14 RX ADMIN — TRIAMCINOLONE ACETONIDE: 1 CREAM TOPICAL at 00:00

## 2025-01-14 ASSESSMENT — LOCATION ZONE DERM
LOCATION ZONE: NOSE
LOCATION ZONE: TRUNK
LOCATION ZONE: FACE

## 2025-01-14 ASSESSMENT — LOCATION DETAILED DESCRIPTION DERM
LOCATION DETAILED: LEFT MEDIAL MALAR CHEEK
LOCATION DETAILED: NASAL DORSUM
LOCATION DETAILED: RIGHT MEDIAL MALAR CHEEK
LOCATION DETAILED: LEFT INFERIOR LATERAL MIDBACK

## 2025-01-14 ASSESSMENT — LOCATION SIMPLE DESCRIPTION DERM
LOCATION SIMPLE: LEFT CHEEK
LOCATION SIMPLE: RIGHT CHEEK
LOCATION SIMPLE: NOSE
LOCATION SIMPLE: LEFT LOWER BACK

## 2025-01-14 NOTE — HPI: SKIN LESION
How Severe Is Your Skin Lesion?: mild
treated_been_treated
Is This A New Presentation, Or A Follow-Up?: Skin Lesion
When Was It Treated?: 1/12/25

## 2025-01-14 NOTE — PROCEDURE: PRESCRIPTION MEDICATION MANAGEMENT
Render In Strict Bullet Format?: No
Detail Level: Zone
Continue Regimen: Bactrim DS until finished as prescribed by urgent care physician

## 2025-01-20 ENCOUNTER — RX ONLY (RX ONLY)
Age: 60
End: 2025-01-20

## 2025-02-24 ENCOUNTER — APPOINTMENT (OUTPATIENT)
Dept: URBAN - METROPOLITAN AREA CLINIC 117 | Facility: CLINIC | Age: 60
Setting detail: DERMATOLOGY
End: 2025-02-24

## 2025-02-24 DIAGNOSIS — L71.8 OTHER ROSACEA: ICD-10-CM

## 2025-02-24 PROCEDURE — 99213 OFFICE O/P EST LOW 20 MIN: CPT

## 2025-02-24 PROCEDURE — ? PRESCRIPTION MEDICATION MANAGEMENT

## 2025-02-24 PROCEDURE — ? COUNSELING

## 2025-02-24 ASSESSMENT — SEVERITY ASSESSMENT OVERALL AMONG ALL PATIENTS: IN YOUR EXPERIENCE, AMONG ALL PATIENTS YOU HAVE SEEN WITH THIS CONDITION, HOW SEVERE IS THIS PATIENT'S CONDITION?: MILD

## 2025-02-24 ASSESSMENT — LOCATION ZONE DERM
LOCATION ZONE: FACE
LOCATION ZONE: NOSE

## 2025-02-24 ASSESSMENT — LOCATION SIMPLE DESCRIPTION DERM
LOCATION SIMPLE: RIGHT CHEEK
LOCATION SIMPLE: LEFT CHEEK
LOCATION SIMPLE: NOSE

## 2025-02-24 ASSESSMENT — LOCATION DETAILED DESCRIPTION DERM
LOCATION DETAILED: LEFT MEDIAL MALAR CHEEK
LOCATION DETAILED: RIGHT MEDIAL MALAR CHEEK
LOCATION DETAILED: NASAL DORSUM

## 2025-02-24 NOTE — PROCEDURE: PRESCRIPTION MEDICATION MANAGEMENT
Render In Strict Bullet Format?: No
Detail Level: Zone
Initiate Treatment: CeraVe, cedaphil, purpose, vanicream
Discontinue Regimen: Oil of Olay
Continue Regimen: Ivermectin qhs

## 2025-04-28 NOTE — PROCEDURE: MIPS QUALITY
Please click on link to view Audiogram:  Document on 4/28/2025 3:41 PM by Penelope Bill AU.D: Audiogram    Ms. Rachel Asif, a 87 y.o. female, was seen in the clinic today for an audiological evaluation. Patient's main complaint was bilateral hearing loss.    Otoscopy clear bilaterally. Tympanometry testing revealed a Type As tympanogram for the right ear and a Type As tympanogram for the left ear.     Audiological testing revealed a mild sloping to severe sensorineural hearing loss (SNHL) bilaterally. A speech reception threshold was obtained at 40 dBHL for the right ear and at 45 dBHL for the left ear. Speech discrimination was 88% for the right ear and 92% for the left ear.      Today's results were discussed with the patient. Patient expressed understanding of hearing test results and recommendations.    Recommendations:  1. Annual audiological evaluation, sooner if medically necessary or if hearing changes  2. Hearing aid consultation   
Quality 226: Preventive Care And Screening: Tobacco Use: Screening And Cessation Intervention: Patient screened for tobacco use, is a smoker AND received Cessation Counseling within measurement period or in the six months prior to the measurement period
Detail Level: Detailed